# Patient Record
Sex: FEMALE
[De-identification: names, ages, dates, MRNs, and addresses within clinical notes are randomized per-mention and may not be internally consistent; named-entity substitution may affect disease eponyms.]

---

## 2023-03-07 ENCOUNTER — HOSPITAL ENCOUNTER (EMERGENCY)
Dept: HOSPITAL 75 - ER | Age: 18
Discharge: HOME | End: 2023-03-07
Payer: MEDICAID

## 2023-03-07 VITALS — HEIGHT: 67.72 IN | WEIGHT: 209.44 LBS | BODY MASS INDEX: 32.11 KG/M2

## 2023-03-07 VITALS — DIASTOLIC BLOOD PRESSURE: 87 MMHG | SYSTOLIC BLOOD PRESSURE: 146 MMHG

## 2023-03-07 DIAGNOSIS — L02.214: Primary | ICD-10-CM

## 2023-03-07 PROCEDURE — 10160 PNXR ASPIR ABSC HMTMA BULLA: CPT

## 2023-03-07 NOTE — ED INTEGUMENTARY GENERAL
General


Chief Complaint:  Skin/Wound Problems


Stated Complaint:  BOIL IN GROIN AREA


Nursing Triage Note:  


ABSCESS ON GROIN.


Source:  patient


Exam Limitations:  no limitations





History of Present Illness


Date Seen by Provider:  Mar 7, 2023


Time Seen by Provider:  12:22


Initial Comments


17-year-old female presents with abscess in groin for the last 2 days.  States 

it has gotten bigger in size, denies any drainage.  Denies fevers, abdominal 

pain, nausea, vomiting.  Has history of hidradenitis suppurativa, for which she 

takes 100 mg of Doxycycline daily.  States she has not had an abscess in a long 

time since being on the doxycycline.





Allergies and Home Medications


Allergies


Coded Allergies:  


     lavinia (Verified  Allergy, Severe, HIVES, 3/7/23)





Patient Home Medication List


Home Medication List Reviewed:  Yes





Review of Systems


Review of Systems


Constitutional:  see HPI





Past Medical-Social-Family Hx


Patient Social History


Smoking Status:  Never a Smoker


Substance use?:  No


Alcohol Use?:  No





Immunizations Up To Date


Third COVID19 Vaccination Date:  UNKNOWN


COVID19 Vaccine :  UNKNOWN





Physical Exam


Vital Signs





Vital Signs - First Documented








 3/7/23





 12:06


 


Temp 37.0


 


Pulse 60


 


Resp 16


 


B/P (MAP) 135/68 (90)


 


Pulse Ox 100


 


O2 Delivery Room Air





Capillary Refill : Less Than 3 Seconds


General Appearance:  WD/WN, mild distress


Neck:  supple, normal inspection


Cardiovascular:  regular rate, rhythm, no edema, no gallop, no JVD, no murmur


Respiratory:  lungs clear, normal breath sounds, no respiratory distress, no 

accessory muscle use


Extremities:  normal range of motion, normal inspection


Neurologic/Psychiatric:  alert, normal mood/affect, oriented x 3


Skin:  normal color, warm/dry


Skin Problem Location:  other (Groin)


Skin Problem Character:  abscess (Abscess to right posterior groin, induration, 

area of fluctuation, tenderness to palpation)





Progress/Results/Core Measures


Results/Orders


My Orders





Orders - ROWAN SUAZO


Lidocaine 1% Inj 10 Ml (Xylocaine 1% Inj (3/7/23 12:45)


Ibuprofen  Tablet (Motrin  Tablet) (3/7/23 13:45)





Medications Given in ED





Vital Signs/I&O











 3/7/23 3/7/23





 12:06 14:17


 


Temp 37.0 


 


Pulse 60 72


 


Resp 16 16


 


B/P (MAP) 135/68 (90) 146/87


 


Pulse Ox 100 100


 


O2 Delivery Room Air Room Air














Blood Pressure Mean:                    90











Progress


Progress Note :  


Progress Note


Patient seen and evaluated, sitting in bed, mild distress. Abscess drained, 

Penrose drain inserted and sutured in place.  Patient instructed to increase her

dose of doxycycline to 100 mg twice a day for the next 7 days.  Patient instr

ucted to follow-up with [facility], [facility], or return here in 2 days to have 

the drain removed.  Discharge directions and return precautions provided.





Departure


Impression





   Primary Impression:  


   Abscess


Disposition:  01 HOME, SELF-CARE


Condition:  Stable





Departure-Patient Inst.


Decision time for Depature:  14:10


Patient Instructions:  Abscess Incision and Drainage (DC)





Add. Discharge Instructions:  


Increase your doxycycline to 100 mg twice a day for the next 7 days.


You may take Tylenol or ibuprofen for pain.


See [facility], [facility], or return here in 2 days to 

have the drain removed.


You should also follow-up with your dermatologist.


Return for worsening pain, fevers, or any other new, concerning, or worsening 

symptoms.





All discharge instructions reviewed with patient and/or family. Voiced 

understanding.


Work/School Note:  School/Childcare Release,    Date Seen in the Emergency 

Department:  Mar 7, 2023


      Return to School:  Mar 8, 2023


   Restrictions:  No Restrictions


      Work Release Form   Date Seen in the Emergency Department:  Mar 7, 2023


   Return to Work:  Mar 8, 2023


   Restrictions:  No Restrictions











ROWAN SUAZO         Mar 7, 2023 12:32

## 2023-03-11 ENCOUNTER — HOSPITAL ENCOUNTER (EMERGENCY)
Dept: HOSPITAL 75 - ER | Age: 18
Discharge: HOME | End: 2023-03-11
Payer: MEDICAID

## 2023-03-11 VITALS — SYSTOLIC BLOOD PRESSURE: 127 MMHG | DIASTOLIC BLOOD PRESSURE: 70 MMHG

## 2023-03-11 DIAGNOSIS — L02.214: Primary | ICD-10-CM

## 2023-03-11 PROCEDURE — 99282 EMERGENCY DEPT VISIT SF MDM: CPT

## 2023-05-06 ENCOUNTER — HOSPITAL ENCOUNTER (EMERGENCY)
Dept: HOSPITAL 75 - ER | Age: 18
Discharge: HOME | End: 2023-05-06
Payer: MEDICAID

## 2023-05-06 VITALS — HEIGHT: 67.72 IN | BODY MASS INDEX: 35.15 KG/M2 | WEIGHT: 229.28 LBS

## 2023-05-06 VITALS — SYSTOLIC BLOOD PRESSURE: 137 MMHG | DIASTOLIC BLOOD PRESSURE: 87 MMHG
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
## 2023-05-06 DIAGNOSIS — S93.402A: Primary | ICD-10-CM

## 2023-05-06 PROCEDURE — 73610 X-RAY EXAM OF ANKLE: CPT

## 2023-05-06 NOTE — DIAGNOSTIC IMAGING REPORT
CLINICAL HISTORY: Left ankle pain. Injury.



COMPARISON: None.



TECHNIQUE: 3 views of the left ankle.



FINDINGS: 

There is no acute fracture or dislocation of the left ankle. 

Alignment is anatomic.  The imaged joint spaces are preserved. No

focal osseous lesions.



IMPRESSION: 

1. No acute fracture or dislocation in the left ankle.



Dictated by: 



  Dictated on workstation # [id number]

## 2023-05-06 NOTE — ED LOWER EXTREMITY
General


Chief Complaint:  Lower Extremity


Stated Complaint:  INJ LEFT ANKLE


Source:  patient


Exam Limitations:  no limitations


 (RASHEL TOWNSEND)





History of Present Illness


Date Seen by Provider:  May 6, 2023


Time Seen by Provider:  13:00


Initial Comments


Patient is a 17-year-old female who presents ED with left lateral ankle pain.  

She states around 2 AM she missed the last step on some stairs twisting her left

ankle inward.  She was able to go home afterwards.  Today she is not able to 

bear weight.  Dull pain to the left lateral ankle with any type of movement.  

She noted notable swelling.  Denies taking thing for pain.  No history of 

previous fracture.  Denies of any calf pain, foot pain, knee pain.  She denies 

hitting her head


 (RASHEL TOWNSEND)





Allergies and Home Medications


Allergies


Coded Allergies:  


     lavinia (Verified  Allergy, Severe, HIVES, 3/7/23)





Patient Home Medication List


Home Medication List Reviewed:  Yes


 (RASHEL TOWNSEND)


Ibuprofen (Ibuprofen) 600 Mg Tablet, 600 MG PO Q6H


   Prescribed by: MARIANELA CHOPRA on 5/6/23 2697





Review of Systems


Constitutional:  No chills, No diaphoresis, No malaise, No weakness


EENTM:  No ear pain, No blurred vision, No double vision, No mouth pain, No 

mouth swelling


Respiratory:  No cough, No dyspnea on exertion


Cardiovascular:  No chest pain


Gastrointestinal:  No abdominal pain, No diarrhea, No nausea, No vomiting


Genitourinary:  No decreased output, No discharge


Musculoskeletal:  joint pain, joint swelling, muscle pain


Skin:  No change in color (RASHEL TOWNSEND)





All Other Systems Reviewed


Negative Unless Noted:  Yes


 (RASHEL TOWNSEND)





Past Medical-Social-Family Hx


Patient Social History


Tobacco Use?:  No


Substance use?:  No


Alcohol Use?:  No


Pt feels they are or have been:  No


 (RASHEL TOWNSEND)





Immunizations Up To Date


First/Initial COVID19 Vaccinat:  X3


Second COVID19 Vaccination Mulugeta:  X3


Third COVID19 Vaccination Date:  X3


COVID19 Vaccine :  [employer]


 (RASHEL TOWNSEND)





Past Medical History


Surgery/Hospitalization HX:  


[facility]


 (RASHEL TOWNSEND)





Physical Exam


Vital Signs





Vital Signs - First Documented








 5/6/23 5/6/23





 12:54 13:55


 


Temp 36.7 


 


Pulse 86 


 


Resp 18 


 


B/P (MAP) 114/81 (92) 


 


Pulse Ox  97


 


O2 Delivery Room Air 








 (GRETA CONNELL MD)


Vital Signs


Capillary Refill :  


 (RASHEL TOWNSEND)


Height, Weight, BMI


Height: '"


Weight: lbs. oz. kg; 32.00 BMI


Method:


General Appearance:  WD/WN, no apparent distress


HEENT:  PERRL/EOMI, normal ENT inspection, TMs normal, pharynx normal


Neck:  non-tender, full range of motion, supple, normal inspection


Cardiovascular:  regular rate, rhythm, no edema, no gallop, no JVD


Respiratory:  chest non-tender, lungs clear, normal breath sounds, no 

respiratory distress, no accessory muscle use


Gastrointestinal:  normal bowel sounds, non tender, soft, no organomegaly


Hips:  bilateral hip non-tender, bilateral hip normal inspection, bilateral hip 

normal range of motion


Knees:  bilateral knee non-tender, bilateral knee normal inspection, bilateral 

knee normal range of motion


Ankles:  left ankle normal range of motion, left ankle pain (Tenderness to left 

lateral malleolus.), left ankle soft tissue tenderness, left ankle swelling


Feet:  bilateral foot non-tender, bilateral foot normal inspection, bilateral 

foot normal range of motion, bilateral foot other (Dorsalis pedis left foot +2. 

No foot tenderness)


Neurologic/Psychiatric:  CNs II-XII nml as tested, no motor/sensory deficits, 

alert, normal mood/affect, oriented x 3


Skin:  normal color, warm/dry (RASHEL TOWNSEND)





Progress/Results/Core Measures


Results/Orders


Vital Signs/I&O











 5/6/23 5/6/23





 12:54 13:55


 


Temp 36.7 36.1


 


Pulse 86 86


 


Resp 18 18


 


B/P (MAP) 114/81 (92) 137/87


 


Pulse Ox  97


 


O2 Delivery Room Air Room Air





 (GRETA CONNELL MD)





Departure


Communication (PCP)


Tenderness to the left lateral ankle with significant swelling.  Does have 

adequate movement but with pain.  Dorsalis pedis, posterior tibialis +2.  No 

foot tenderness.  X-ray was ordered concerning for fracture versus ankle sprain.

 X-ray was negative for acute fracture.  Discussed all results with patient.  

Views anything for pain.  Ice was applied.  Recommend ice 3-4 times for the next

3 to 4 days, Ace wrap for support.  Was given crutches.  Discussed lace brace, 

Aircast to give support.  Orthopedic follow-up in 10 to 14 days if pain progress

for recheck with x-ray.  If any worsening symptoms return back to ED for further

evaluation


 (RASHEL TOWNSEND)





Impression





   Primary Impression:  


   Ankle sprain


Disposition:  01 HOME, SELF-CARE


Condition:  Stable





Departure-Patient Inst.


Decision time for Depature:  13:36


 (RASHEL TOWNSEND)


Referrals:  


[facility]





SANTOS,LOCAL PHYSICIAN (PCP)


Primary Care Physician








JANNIE LORENZANA MD


Patient Instructions:  Ankle Sprain ED





Add. Discharge Instructions:  


Recommend anti-inflammatories to help with pain.  Crutches as needed.  Ice and 

elevate.  Orthopedic follow-up in 7 to 10 days if pain progress





All discharge instructions reviewed with patient and/or family. Voiced 

understanding.


Scripts


Ibuprofen (Ibuprofen) 600 Mg Tablet


600 MG PO Q6H for PAIN, #16 TAB 0 Refills


   Prov: RASHEL TOWNSEND         5/6/23


Work/School Note:  Work Release Form   Date Seen in the Emergency Department:  

May 6, 2023


   Return to Work:  May 8, 2023








ATTENDING PHYSICIAN NOTE:


I was physically present as attending physician in the emergency department 

during the care of this patient, but I was not directly involved in the decision

making or delivery of care for this patient. 


 (GRETA CONNELL MD)











RASHEL TOWNSEND           May 6, 2023 13:02


GRETA CONNELL MD         May 6, 2023 15:22

## 2023-06-02 NOTE — ED INTEGUMENTARY GENERAL
General


Chief Complaint:  Skin/Wound Problems


Stated Complaint:  SUTURE REMOVAL


Source:  patient


Exam Limitations:  no limitations





History of Present Illness


Date Seen by Provider:  Mar 11, 2023


Time Seen by Provider:  12:20


Initial Comments


17-year-old female presents to the ED for wound check.  She was here on 3/7 for 

an abscess to the right groin.  It was I&D'd at that time.  A Penrose drain was 

placed.  Patient thinks that the drain has fallen out now.  She reports she had 

a lot of pain the first 2 days after restraints, but it is currently feeling 

better.





Allergies and Home Medications


Allergies


Coded Allergies:  


     lavinia (Verified  Allergy, Severe, HIVES, 3/7/23)





Patient Home Medication List


Home Medication List Reviewed:  Yes





Review of Systems


Review of Systems


Constitutional:  no symptoms reported


Respiratory:  no symptoms reported


Cardiovascular:  no symptoms reported


Skin:  other (Wound to right groin)





Past Medical-Social-Family Hx


Patient Social History


Tobacco Use?:  No


Use of E-Cig and/or Vaping dev:  No


Substance use?:  No


Alcohol Use?:  No


Pt feels they are or have been:  No





Immunizations Up To Date


Influenza Vaccine Up-to-Date:  No; Not Current


First/Initial COVID19 Vaccinat:  X3


Second COVID19 Vaccination Mulugeta:  X3


Third COVID19 Vaccination Date:  UNKNOWN





Past Medical History


Surgery/Hospitalization HX:  


[facility]





Physical Exam


Vital Signs


Capillary Refill :


General Appearance:  WD/WN, no apparent distress


Neck:  supple, normal inspection


Cardiovascular:  regular rate, rhythm, no edema, no gallop, no JVD, no murmur


Respiratory:  lungs clear, normal breath sounds, no respiratory distress, no 

accessory muscle use


Extremities:  normal range of motion, normal inspection


Neurologic/Psychiatric:  alert, normal mood/affect


Skin:  normal color, warm/dry


Skin Problem Location:  other (Right groin)


Skin Problem Character:  other (Small healing wound, no abscess)





Progress/Results/Core Measures


Progress


Progress Note :  


   Time:  12:25


Progress Note


Patient seen and evaluated, resting comfortably, no distress.  Penrose drain 

removed.  Healing wound noted to right groin.  No area of abscess.  Will 

discharge at this time.  Patient instructed to continue doxycycline twice a day 

until she reaches 7 days.  Instructed to follow-up with dermatologist.  Sonia lundberg instructions and return precautions provided.





Departure


Impression





   Primary Impression:  


   Abscess


Disposition:  01 HOME, SELF-CARE


Condition:  Stable





Departure-Patient Inst.


Decision time for Depature:  12:25


Referrals:  


NO,LOCAL PHYSICIAN (PCP/Family)


Primary Care Physician


Patient Instructions:  Hidradenitis Suppurativa





Add. Discharge Instructions:  


Continue doxycycline twice a day until you have taken it twice a day for 7 days.


Follow-up with your dermatologist.


Return for increased pain, fever, or any other new, concerning, or worsening 

symptoms.





All discharge instructions reviewed with patient and/or family. Voiced 

understanding.











ROWAN SUAZO        Mar 11, 2023 12:26 Hydroxychloroquine Counseling:  I discussed with the patient that a baseline ophthalmologic exam is needed at the start of therapy and every year thereafter while on therapy. A CBC may also be warranted for monitoring.  The side effects of this medication were discussed with the patient, including but not limited to agranulocytosis, aplastic anemia, seizures, rashes, retinopathy, and liver toxicity. Patient instructed to call the office should any adverse effect occur.  The patient verbalized understanding of the proper use and possible adverse effects of Plaquenil.  All the patient's questions and concerns were addressed.